# Patient Record
Sex: FEMALE | Race: WHITE
[De-identification: names, ages, dates, MRNs, and addresses within clinical notes are randomized per-mention and may not be internally consistent; named-entity substitution may affect disease eponyms.]

---

## 2018-02-14 ENCOUNTER — HOSPITAL ENCOUNTER (EMERGENCY)
Dept: HOSPITAL 84 - D.ER | Age: 58
Discharge: HOME | End: 2018-02-14
Payer: COMMERCIAL

## 2018-02-14 DIAGNOSIS — K21.9: ICD-10-CM
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
## 2018-02-14 DIAGNOSIS — Z85.038: ICD-10-CM

## 2018-02-14 DIAGNOSIS — I10: ICD-10-CM

## 2018-02-14 DIAGNOSIS — S83.92XA: ICD-10-CM

## 2018-02-14 DIAGNOSIS — F17.200: ICD-10-CM
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
## 2018-02-14 DIAGNOSIS — S93.401A: Primary | ICD-10-CM

## 2018-02-28 ENCOUNTER — HOSPITAL ENCOUNTER (EMERGENCY)
Dept: HOSPITAL 84 - D.ER | Age: 58
Discharge: LEFT BEFORE DISCHARGE | End: 2018-02-28
Payer: COMMERCIAL

## 2018-02-28 DIAGNOSIS — I10: Primary | ICD-10-CM

## 2020-05-19 ENCOUNTER — HOSPITAL ENCOUNTER (OUTPATIENT)
Dept: HOSPITAL 84 - D.OPS | Age: 60
Discharge: HOME | End: 2020-05-19
Attending: INTERNAL MEDICINE
Payer: COMMERCIAL

## 2020-05-19 VITALS — HEIGHT: 63 IN | BODY MASS INDEX: 31.25 KG/M2 | BODY MASS INDEX: 31.25 KG/M2 | WEIGHT: 176.37 LBS

## 2020-05-19 VITALS — DIASTOLIC BLOOD PRESSURE: 69 MMHG | SYSTOLIC BLOOD PRESSURE: 115 MMHG

## 2020-05-19 DIAGNOSIS — K63.5: Primary | ICD-10-CM

## 2020-05-19 DIAGNOSIS — K64.8: ICD-10-CM

## 2020-05-19 LAB
ANION GAP SERPL CALC-SCNC: 11.3 MMOL/L (ref 8–16)
APTT BLD: 31.9 SECONDS (ref 22.8–39.4)
BASOPHILS NFR BLD AUTO: 0.5 % (ref 0–2)
BUN SERPL-MCNC: 22 MG/DL (ref 7–18)
CALCIUM SERPL-MCNC: 8.8 MG/DL (ref 8.5–10.1)
CHLORIDE SERPL-SCNC: 104 MMOL/L (ref 98–107)
CO2 SERPL-SCNC: 29.5 MMOL/L (ref 21–32)
CREAT SERPL-MCNC: 0.8 MG/DL (ref 0.6–1.3)
EOSINOPHIL NFR BLD: 1.9 % (ref 0–7)
ERYTHROCYTE [DISTWIDTH] IN BLOOD BY AUTOMATED COUNT: 13.3 % (ref 11.5–14.5)
GLUCOSE SERPL-MCNC: 145 MG/DL (ref 74–106)
HCT VFR BLD CALC: 44.2 % (ref 36–48)
HGB BLD-MCNC: 14 G/DL (ref 12–16)
IMM GRANULOCYTES NFR BLD: 0.1 % (ref 0–5)
INR PPP: 0.95 (ref 0.85–1.17)
LYMPHOCYTES NFR BLD AUTO: 21.1 % (ref 15–50)
MCH RBC QN AUTO: 29.2 PG (ref 26–34)
MCHC RBC AUTO-ENTMCNC: 31.7 G/DL (ref 31–37)
MCV RBC: 92.3 FL (ref 80–100)
MONOCYTES NFR BLD: 16 % (ref 2–11)
NEUTROPHILS NFR BLD AUTO: 60.4 % (ref 40–80)
OSMOLALITY SERPL CALC.SUM OF ELEC: 286 MOSM/KG (ref 275–300)
PLATELET # BLD: 298 10X3/UL (ref 130–400)
PMV BLD AUTO: 10.3 FL (ref 7.4–10.4)
POTASSIUM SERPL-SCNC: 3.8 MMOL/L (ref 3.5–5.1)
PROTHROMBIN TIME: 12.6 SECONDS (ref 11.6–15)
RBC # BLD AUTO: 4.79 10X6/UL (ref 4–5.4)
SODIUM SERPL-SCNC: 141 MMOL/L (ref 136–145)
WBC # BLD AUTO: 8.6 10X3/UL (ref 4.8–10.8)

## 2020-05-19 NOTE — OP
PATIENT NAME:  GALILEA STALLWORTH                              MEDICAL RECORD: [id number]
:60                                             LOCATION:[facility]          
                                                         ADMISSION DATE:        
SURGEON:  LE MENDES MD          
 
 
DATE OF OPERATION:  2020
 
PROCEDURE:  Colonoscopy with biopsy and polypectomy.
 
REFERRING PHYSICIAN:  THAO Diaz
 
INDICATIONS:  Ms. Stallworth is a delightful 60-year-old woman with a history of
rectal cancer (2009, it was T2N0M0 with surgery preceded by chemotherapy
and radiation therapy).  She has had symptoms of generalized abdominal pain
associated with eating.  She has been intermittent over the past year.  She has
also had a mucus discharge from her rectum and her stools have been thinner over
the past year.  She has had occasional bright red blood per rectum typically;
however, having a bowel movement every 3-4 days.  Her last colonoscopy was
2018 with findings of a previous colorectal anastomosis in the distal
rectum (biopsies were benign), normal mucosa in the terminal ileum and two
splenic flexure polyps.  On biopsy, no adenoma or hyperplastic changes were
identified.  She presents for outpatient colonoscopy.
 
PREMEDICATIONS:  Total IV anesthesia, propofol 350 mg.
 
INSTRUMENT:  Deehubs video colonoscope, pediatric.
 
PROCEDURE AND FINDINGS:  After receiving informed consent, Ms. Stallworth was placed
in left lateral decubitus position and sedated as per anesthesia.  After
achieving an adequate level of sedation, digital rectal exam was performed that
showed no external hemorrhoidal tags, fissures or fistulas, normal sphincter
tone, no palpable rectal masses.  Colonoscope was introduced per rectally and
advanced to the cecum without difficulty.  The cecum, IC valve, and appendiceal
orifice were identified.  As the colonoscope was withdrawn, careful inspection
was made to the walls of the colon.  Overall mucosa had normal vascular and fold
pattern.  At the hepatic flexure was a 0.5 cm sessile polyp removed with hot
biopsy forcep technique.  The distal colorectal anastomosis was identified and
appeared unremarkable.  The anastomosis was widely open and it was not
strictured or narrowed.  Biopsies were taken from the distal rectum, mild
internal hemorrhoids were noted on withdrawing the colonoscope.  A fair to good
prep was present.  Ms. Stallworth tolerated the procedure well, no immediate
complications.  Withdrawal time was 8 minutes.
 
ASSESSMENT:
1.  Patent colorectal anastomosis, status post biopsy.
2.  Polyp with hepatic flexure.
3.  Mild internal hemorrhoids.
 
RECOMMENDATIONS:
1.  Follow up histopathology.
2.  Avoid aspirin, nonsteroidal anti-inflammatory drugs and IRAHETA-2 inhibitors for
14 days post polypectomy.
3.  High fiber diet.
4.  Daily Metamucil.
5.  Anucort HC suppositories 1 per rectum b.i.d. for 14 days.
6.  Colonoscopy in 3 years.
 
 
 
 
OPERATIVE REPORT                               [id number]    BASIMGALILEA HITCHCOCK            
 
 
TRANSINT:[id number] Voice Confirmation ID: [id number] DOCUMENT ID: [id number]
                                           
                                           LE MENDES MD          
 
 
 
 
 
 
 
 
 
 
 
 
 
 
 
 
 
 
 
 
 
 
 
 
 
 
 
 
 
 
 
 
 
 
 
 
 
 
 
 
 
 
 
 
CC: MOI JOHANSEN                                               [id number]
DICTATION DATE: 20 1118     :     20 1422      [facility]                                          
[address] [place] [address]

## 2021-06-02 LAB
ANION GAP SERPL CALC-SCNC: 7.4 MMOL/L (ref 8–16)
BASOPHILS NFR BLD AUTO: 1.2 % (ref 0–2)
BUN SERPL-MCNC: 15 MG/DL (ref 7–18)
CALCIUM SERPL-MCNC: 8.8 MG/DL (ref 8.5–10.1)
CHLORIDE SERPL-SCNC: 107 MMOL/L (ref 98–107)
CO2 SERPL-SCNC: 28.6 MMOL/L (ref 21–32)
CREAT SERPL-MCNC: 0.7 MG/DL (ref 0.6–1.3)
EOSINOPHIL NFR BLD: 2.9 % (ref 0–7)
ERYTHROCYTE [DISTWIDTH] IN BLOOD BY AUTOMATED COUNT: 13.6 % (ref 11.5–14.5)
GLUCOSE SERPL-MCNC: 145 MG/DL (ref 74–106)
HCT VFR BLD CALC: 40.9 % (ref 36–48)
HGB BLD-MCNC: 13.7 G/DL (ref 12–16)
LYMPHOCYTES # BLD: 2.3 10X3/UL (ref 1.18–3.74)
LYMPHOCYTES NFR BLD AUTO: 32.5 % (ref 15–50)
MCH RBC QN AUTO: 29.3 PG (ref 26–34)
MCHC RBC AUTO-ENTMCNC: 33.5 G/DL (ref 31–37)
MCV RBC: 87.3 FL (ref 80–100)
MONOCYTES NFR BLD: 15.2 % (ref 2–11)
NEUTROPHILS # BLD: 3.4 10X3/UL (ref 1.56–6.13)
NEUTROPHILS NFR BLD AUTO: 48.2 % (ref 40–80)
OSMOLALITY SERPL CALC.SUM OF ELEC: 281 MOSM/KG (ref 275–300)
PLATELET # BLD: 268 10X3/UL (ref 130–400)
PMV BLD AUTO: 8.5 FL (ref 7.4–10.4)
POTASSIUM SERPL-SCNC: 4 MMOL/L (ref 3.5–5.1)
RBC # BLD AUTO: 4.68 10X6/UL (ref 4–5.4)
SODIUM SERPL-SCNC: 139 MMOL/L (ref 136–145)
WBC # BLD AUTO: 7.1 10X3/UL (ref 4.8–10.8)

## 2021-06-04 ENCOUNTER — HOSPITAL ENCOUNTER (OUTPATIENT)
Dept: HOSPITAL 84 - D.OPS | Age: 61
Discharge: HOME | End: 2021-06-04
Attending: ORTHOPAEDIC SURGERY
Payer: COMMERCIAL

## 2021-06-04 VITALS — WEIGHT: 170 LBS | HEIGHT: 63 IN | BODY MASS INDEX: 30.12 KG/M2 | BODY MASS INDEX: 30.12 KG/M2

## 2021-06-04 VITALS — SYSTOLIC BLOOD PRESSURE: 127 MMHG | DIASTOLIC BLOOD PRESSURE: 85 MMHG

## 2021-06-04 DIAGNOSIS — G56.02: Primary | ICD-10-CM

## 2021-06-04 NOTE — NUR
1835 DR. WOO ON THE UNIT AND MADE AWARE OF PATIENT'S CONDITION
WITH REGARD TO NAUSEA AND VOMITING. HE ORDERED ZOFRAN ODT X 1 NOW AND
SENT IN A SCRIPT FOR MORE ZOFRAN TO PATIENT'S PHARMACY.

## 2021-06-04 NOTE — NUR
1845 PATIENT WANTS TO GO AHEAD AND TRY TO GO HOME TO SEE IF GETTING
QUIET AT HER OWN HOME WILL HELP THE NAUSEA AND VOMITING PASS. SHE HAS
ALSO NOW STARTED HAVING LOOSE STOOLS AND IS INCONTINENT DUE TO
HISTORY OF COLON CANCER.

## 2021-06-05 NOTE — OP
PATIENT NAME:  GALILEA STALLWORTH                              MEDICAL RECORD: [id number]
:60                                             LOCATION:D.OPS          
                                                         ADMISSION DATE:        
SURGEON:  HANK WOO DO         
 
 
DATE OF OPERATION:  2021
 
PROCEDURE PERFORMED:  Left endoscopic carpal tunnel release.
 
PREOPERATIVE DIAGNOSIS:  Left carpal tunnel syndrome.
 
POSTOPERATIVE DIAGNOSIS:  Left carpal tunnel syndrome.
 
INDICATIONS:  Ms. Stallworth is a 61-year-old female who has had left hand numbness
and tingling for quite some time.  She had a nerve conduction study showing the
above findings of carpal tunnel syndrome.  She is aware of the risks of this
including damage to the median nerve, continued pain, numbness and tingling,
infection and bleeding and she signed the consent.
 
SURGEON:  Hank Woo DO.
 
DESCRIPTION OF PROCEDURE:  The patient was taken to the operative suite, laid in
supine position, given general anesthetic and LMA was placed.  She was given 2
grams of Ancef preoperatively.  The left upper extremity was prepped and draped
in sterile fashion.  A timeout was performed.  Everyone was in agreement with
correct side, site, patient and procedure.  I then began by exsanguinating the
left upper extremity.  Tourniquet was inflated to 250 mmHg.  It is up for 9
minutes.  I then made an incision centered over the palmaris longus tendon and
made blunt dissection with [place] down to the median nerve.  I then released
the forearm fascia in the incision site from distal to proximal.  I then entered
the carpal tunnel with the dilators and brought the sheath in.  She had adipose
tissue in the carpal tunnel, which I cleaned out with a rasp and a probe and
then brought the blade in and raised it up and transected the transverse carpal
ligament fat herniating down into the carpal tunnel and removed all the
instruments.  I then used the long end of the Ragnell and scissors to ensure
there were no remaining fibers in the transverse carpal ligament and there were
not.  We then injected the site with 0.25% Marcaine with epinephrine, 10 mL of
it around the site and let the tourniquet down at 9 minutes.  Sorin Hickman,
certified surgical assist, then closed with 5-0 Monocryl in inverted interrupted
fashion.  She was dressed with Steri-Strips, Adaptic, 4 x 4, Kerlix, and Coban
lightly wrapped on the hand.  She was awakened and taken to recovery in stable
condition.
 
BLOOD LOSS:  Minimal.
 
COMPLICATIONS:  None.
 
TRANSINT:[id number] Voice Confirmation ID: [id number] DOCUMENT ID: [id number]
 
 
 
OPERATIVE REPORT                               [id number]    GALILEA STALLWORTH MICHAEL D, DO         
 
 
 
Electronically Signed by HANK ARELLANO on 21 at 0903
 
 
 
 
 
 
 
 
 
 
 
 
 
 
 
 
 
 
 
 
 
 
 
 
 
 
 
 
 
 
 
 
 
 
 
 
 
 
 
 
 
CC:                                                             [id number]
DICTATION DATE: 21 1538     :     21 0242      [facility] 
                                                                      21
[address] [address]

## 2021-09-13 ENCOUNTER — APPOINTMENT (RX ONLY)
Dept: URBAN - METROPOLITAN AREA CLINIC 24 | Facility: CLINIC | Age: 61
Setting detail: DERMATOLOGY
End: 2021-09-13

## 2021-09-13 DIAGNOSIS — L57.0 ACTINIC KERATOSIS: ICD-10-CM

## 2021-09-13 DIAGNOSIS — L81.4 OTHER MELANIN HYPERPIGMENTATION: ICD-10-CM

## 2021-09-13 DIAGNOSIS — L24 IRRITANT CONTACT DERMATITIS: ICD-10-CM

## 2021-09-13 DIAGNOSIS — Z71.89 OTHER SPECIFIED COUNSELING: ICD-10-CM

## 2021-09-13 DIAGNOSIS — L57.8 OTHER SKIN CHANGES DUE TO CHRONIC EXPOSURE TO NONIONIZING RADIATION: ICD-10-CM

## 2021-09-13 DIAGNOSIS — L82.1 OTHER SEBORRHEIC KERATOSIS: ICD-10-CM

## 2021-09-13 PROBLEM — L24.9 IRRITANT CONTACT DERMATITIS, UNSPECIFIED CAUSE: Status: ACTIVE | Noted: 2021-09-13

## 2021-09-13 PROCEDURE — 17003 DESTRUCT PREMALG LES 2-14: CPT

## 2021-09-13 PROCEDURE — 99204 OFFICE O/P NEW MOD 45 MIN: CPT | Mod: 25

## 2021-09-13 PROCEDURE — ? TREATMENT REGIMEN

## 2021-09-13 PROCEDURE — ? LIQUID NITROGEN

## 2021-09-13 PROCEDURE — ? PRESCRIPTION

## 2021-09-13 PROCEDURE — 17000 DESTRUCT PREMALG LESION: CPT

## 2021-09-13 PROCEDURE — ? COUNSELING

## 2021-09-13 RX ORDER — FLUOROURACIL 2 G/40G
CREAM TOPICAL QHS
Qty: 40 | Refills: 2 | Status: ERX | COMMUNITY
Start: 2021-09-13

## 2021-09-13 RX ADMIN — FLUOROURACIL: 2 CREAM TOPICAL at 00:00

## 2021-09-13 ASSESSMENT — LOCATION ZONE DERM
LOCATION ZONE: HAND
LOCATION ZONE: ARM
LOCATION ZONE: FACE

## 2021-09-13 ASSESSMENT — LOCATION SIMPLE DESCRIPTION DERM
LOCATION SIMPLE: RIGHT EYEBROW
LOCATION SIMPLE: LEFT FOREHEAD
LOCATION SIMPLE: RIGHT HAND
LOCATION SIMPLE: RIGHT FOREHEAD
LOCATION SIMPLE: RIGHT SHOULDER
LOCATION SIMPLE: LEFT HAND
LOCATION SIMPLE: RIGHT CHEEK

## 2021-09-13 ASSESSMENT — LOCATION DETAILED DESCRIPTION DERM
LOCATION DETAILED: LEFT MEDIAL FOREHEAD
LOCATION DETAILED: RIGHT RADIAL DORSAL HAND
LOCATION DETAILED: RIGHT LATERAL EYEBROW
LOCATION DETAILED: 2ND WEB SPACE LEFT HAND
LOCATION DETAILED: RIGHT SUPERIOR CENTRAL MALAR CHEEK
LOCATION DETAILED: RIGHT POSTERIOR SHOULDER
LOCATION DETAILED: RIGHT INFERIOR FOREHEAD
LOCATION DETAILED: RIGHT INFERIOR CENTRAL MALAR CHEEK
LOCATION DETAILED: LEFT FOREHEAD

## 2021-09-13 NOTE — PROCEDURE: LIQUID NITROGEN
Render Post-Care Instructions In Note?: no
Number Of Freeze-Thaw Cycles: 2 freeze-thaw cycles
Duration Of Freeze Thaw-Cycle (Seconds): 5
Consent: The patient's consent was obtained including but not limited to risks of crusting, scabbing, blistering, scarring, darker or lighter pigmentary change, recurrence, incomplete removal and infection.
Show Applicator Variable?: Yes
Detail Level: Simple
Post-Care Instructions: I reviewed with the patient in detail post-care instructions. Patient is to wear sunprotection, and avoid picking at any of the treated lesions. Pt may apply Vaseline to crusted or scabbing areas.

## 2022-05-09 ENCOUNTER — APPOINTMENT (RX ONLY)
Dept: URBAN - METROPOLITAN AREA CLINIC 24 | Facility: CLINIC | Age: 62
Setting detail: DERMATOLOGY
End: 2022-05-09

## 2022-05-09 DIAGNOSIS — Z71.89 OTHER SPECIFIED COUNSELING: ICD-10-CM

## 2022-05-09 DIAGNOSIS — L57.8 OTHER SKIN CHANGES DUE TO CHRONIC EXPOSURE TO NONIONIZING RADIATION: ICD-10-CM

## 2022-05-09 DIAGNOSIS — L57.0 ACTINIC KERATOSIS: ICD-10-CM

## 2022-05-09 DIAGNOSIS — L82.1 OTHER SEBORRHEIC KERATOSIS: ICD-10-CM

## 2022-05-09 PROCEDURE — 17000 DESTRUCT PREMALG LESION: CPT

## 2022-05-09 PROCEDURE — ? COUNSELING

## 2022-05-09 PROCEDURE — ? PRESCRIPTION MEDICATION MANAGEMENT

## 2022-05-09 PROCEDURE — 17003 DESTRUCT PREMALG LES 2-14: CPT

## 2022-05-09 PROCEDURE — ? LIQUID NITROGEN

## 2022-05-09 PROCEDURE — 99214 OFFICE O/P EST MOD 30 MIN: CPT | Mod: 25

## 2022-05-09 ASSESSMENT — LOCATION ZONE DERM
LOCATION ZONE: ARM
LOCATION ZONE: NECK
LOCATION ZONE: NOSE
LOCATION ZONE: TRUNK
LOCATION ZONE: FACE

## 2022-05-09 ASSESSMENT — LOCATION SIMPLE DESCRIPTION DERM
LOCATION SIMPLE: LEFT FOREHEAD
LOCATION SIMPLE: LEFT FOREARM
LOCATION SIMPLE: LEFT CLAVICULAR SKIN
LOCATION SIMPLE: LEFT CHEEK
LOCATION SIMPLE: TRAPEZIAL NECK
LOCATION SIMPLE: RIGHT SHOULDER
LOCATION SIMPLE: RIGHT FOREARM
LOCATION SIMPLE: RIGHT FOREHEAD
LOCATION SIMPLE: RIGHT NOSE

## 2022-05-09 ASSESSMENT — LOCATION DETAILED DESCRIPTION DERM
LOCATION DETAILED: LEFT CLAVICULAR SKIN
LOCATION DETAILED: RIGHT NASAL SIDEWALL
LOCATION DETAILED: LEFT DISTAL DORSAL FOREARM
LOCATION DETAILED: LEFT MEDIAL FOREHEAD
LOCATION DETAILED: RIGHT PROXIMAL DORSAL FOREARM
LOCATION DETAILED: MID TRAPEZIAL NECK
LOCATION DETAILED: RIGHT INFERIOR FOREHEAD
LOCATION DETAILED: LEFT SUPERIOR CENTRAL MALAR CHEEK
LOCATION DETAILED: RIGHT POSTERIOR SHOULDER

## 2022-05-09 NOTE — PROCEDURE: LIQUID NITROGEN
Consent: The patient's consent was obtained including but not limited to risks of crusting, scabbing, blistering, scarring, darker or lighter pigmentary change, recurrence, incomplete removal and infection.
Post-Care Instructions: I reviewed with the patient in detail post-care instructions. Patient is to wear sunprotection, and avoid picking at any of the treated lesions. Pt may apply Vaseline to crusted or scabbing areas.
Number Of Freeze-Thaw Cycles: 1 freeze-thaw cycle
Render Post-Care Instructions In Note?: no
Show Applicator Variable?: Yes
Detail Level: Simple
Duration Of Freeze Thaw-Cycle (Seconds): 4

## 2023-05-17 ENCOUNTER — APPOINTMENT (RX ONLY)
Dept: URBAN - METROPOLITAN AREA CLINIC 24 | Facility: CLINIC | Age: 63
Setting detail: DERMATOLOGY
End: 2023-05-17

## 2023-05-17 DIAGNOSIS — Z71.89 OTHER SPECIFIED COUNSELING: ICD-10-CM

## 2023-05-17 DIAGNOSIS — L82.1 OTHER SEBORRHEIC KERATOSIS: ICD-10-CM

## 2023-05-17 DIAGNOSIS — L57.0 ACTINIC KERATOSIS: ICD-10-CM

## 2023-05-17 DIAGNOSIS — L57.8 OTHER SKIN CHANGES DUE TO CHRONIC EXPOSURE TO NONIONIZING RADIATION: ICD-10-CM

## 2023-05-17 PROCEDURE — ? COUNSELING

## 2023-05-17 PROCEDURE — ? LIQUID NITROGEN

## 2023-05-17 PROCEDURE — 99213 OFFICE O/P EST LOW 20 MIN: CPT | Mod: 25

## 2023-05-17 PROCEDURE — 17000 DESTRUCT PREMALG LESION: CPT

## 2023-05-17 PROCEDURE — ? OTHER

## 2023-05-17 PROCEDURE — 17003 DESTRUCT PREMALG LES 2-14: CPT

## 2023-05-17 ASSESSMENT — LOCATION DETAILED DESCRIPTION DERM
LOCATION DETAILED: LEFT DISTAL DORSAL FOREARM
LOCATION DETAILED: RIGHT INFERIOR FOREHEAD
LOCATION DETAILED: RIGHT PROXIMAL DORSAL FOREARM
LOCATION DETAILED: LEFT FOREHEAD
LOCATION DETAILED: MID TRAPEZIAL NECK
LOCATION DETAILED: LEFT CLAVICULAR SKIN
LOCATION DETAILED: RIGHT POSTERIOR SHOULDER

## 2023-05-17 ASSESSMENT — LOCATION SIMPLE DESCRIPTION DERM
LOCATION SIMPLE: TRAPEZIAL NECK
LOCATION SIMPLE: LEFT CLAVICULAR SKIN
LOCATION SIMPLE: RIGHT SHOULDER
LOCATION SIMPLE: RIGHT FOREARM
LOCATION SIMPLE: LEFT FOREARM
LOCATION SIMPLE: RIGHT FOREHEAD
LOCATION SIMPLE: LEFT FOREHEAD

## 2023-05-17 ASSESSMENT — LOCATION ZONE DERM
LOCATION ZONE: ARM
LOCATION ZONE: FACE
LOCATION ZONE: TRUNK
LOCATION ZONE: NECK

## 2023-05-17 NOTE — PROCEDURE: LIQUID NITROGEN
Post-Care Instructions: I reviewed with the patient in detail post-care instructions. Patient is to wear sunprotection, and avoid picking at any of the treated lesions. Pt may apply Vaseline to crusted or scabbing areas.
Duration Of Freeze Thaw-Cycle (Seconds): 5
Number Of Freeze-Thaw Cycles: 2 freeze-thaw cycles
Render Post-Care Instructions In Note?: no
Detail Level: Detailed
Consent: The patient's consent was obtained including but not limited to risks of crusting, scabbing, blistering, scarring, darker or lighter pigmentary change, recurrence, incomplete removal and infection.
Show Aperture Variable?: Yes

## 2023-05-17 NOTE — PROCEDURE: OTHER
Detail Level: Zone
Note Text (......Xxx Chief Complaint.): This diagnosis correlates with the
Other (Free Text): Discussed doing efudex to scaling spots as they come up if pt still has it on hand
Render Risk Assessment In Note?: no